# Patient Record
Sex: MALE | Race: WHITE | Employment: FULL TIME | URBAN - METROPOLITAN AREA
[De-identification: names, ages, dates, MRNs, and addresses within clinical notes are randomized per-mention and may not be internally consistent; named-entity substitution may affect disease eponyms.]

---

## 2019-09-05 ENCOUNTER — HOSPITAL ENCOUNTER (OUTPATIENT)
Dept: CT IMAGING | Age: 49
Discharge: HOME | End: 2019-09-05
Payer: COMMERCIAL

## 2019-09-05 DIAGNOSIS — J32.9 CHRONIC SINUSITIS, UNSPECIFIED LOCATION: ICD-10-CM

## 2019-09-05 PROCEDURE — 70486 CT MAXILLOFACIAL W/O DYE: CPT

## 2021-05-12 RX ORDER — METHOCARBAMOL 500 MG/1
500 TABLET, FILM COATED ORAL PRN
COMMUNITY

## 2021-05-12 RX ORDER — FENOFIBRATE 134 MG/1
134 CAPSULE ORAL
COMMUNITY

## 2021-05-12 RX ORDER — DEXTROAMPHETAMINE SACCHARATE, AMPHETAMINE ASPARTATE, DEXTROAMPHETAMINE SULFATE AND AMPHETAMINE SULFATE 7.5; 7.5; 7.5; 7.5 MG/1; MG/1; MG/1; MG/1
30 TABLET ORAL DAILY
COMMUNITY

## 2021-05-12 RX ORDER — FLUTICASONE PROPIONATE 50 MCG
1 SPRAY, SUSPENSION (ML) NASAL DAILY
COMMUNITY

## 2021-05-12 RX ORDER — FEXOFENADINE HCL 180 MG/1
180 TABLET ORAL DAILY
COMMUNITY

## 2021-05-12 NOTE — PROGRESS NOTES
[address] [facility] time___5/8/21 1230_________        Surgery time____1330________    Take the following medications with a sip of water:    Do not eat or drink anything after 12:00 midnight prior to your surgery. This includes water chewing gum, mints and ice chips. You may brush your teeth and gargle the morning of your surgery, but do not swallow the water     Please see your family doctor/pediatrician for a history and physical and/or concerning medications. Bring any test results/reports from your physicians office. If you are under the care of a heart doctor or specialist doctor, please be aware that you may be asked to them for clearance    You may be asked to stop blood thinners such as Coumadin, Plavix, Fragmin, Lovenox, etc., or any anti-inflammatories such as:  Aspirin, Ibuprofen, Advil, Naproxen prior to your surgery. We also ask that you stop any OTC medications such as fish oil, vitamin E, glucosamine, garlic, Multivitamins, COQ 10, etc.    We ask that you do not smoke 24 hours prior to surgery  We ask that you do not  drink any alcoholic beverages 24 hours prior to surgery     You must make arrangements for a responsible adult to take you home after your surgery. For your safety you will not be allowed to leave alone or drive yourself home. Your surgery will be cancelled if you do not have a ride home. Also for your safety, it is strongly suggested that someone stay with you the first 24 hours after your surgery. A parent or legal guardian must accompany a child scheduled for surgery and plan to stay at the hospital until the child is discharged. Please do not bring other children with you. For your comfort, please wear simple loose fitting clothing to the hospital.  Please do not bring valuables.     Do not wear any make-up or nail polish on your fingers or toes      For your safety, please do not wear any jewelry or body piercing's on the day of surgery. All jewelry must be removed. If you have dentures, they will be removed before going to operating room. For your convenience, we will provide you with a container. If you wear contact lenses or glasses, they will be removed, please bring a case for them. If you have a living will and a durable power of  for healthcare, please bring in a copy. As part of our patient safety program to minimize surgical site infections, we ask you to do the following:    · Please notify your surgeon if you develop any illness between         now and the  day of your surgery. · This includes a cough, cold, fever, sore throat, nausea,         or vomiting, and diarrhea, etc.  ·  Please notify your surgeon if you experience dizziness, shortness         of breath or blurred vision between now and the time of your surgery. Do not shave your operative site 96 hours prior to surgery. For face and neck surgery, men may use an electric razor 48 hours   prior to surgery. You may shower the night before surgery or the morning of   your surgery with an antibacterial soap. You will need to bring a photo ID and insurance card    [facility] has an onsite pharmacy, would you like to utilize our pharmacy     If you will be staying overnight and use a C-pap machine, please bring   your C-pap to hospital     Our goal is to provide you with excellent care, therefore, visitors will be limited to two(2) in the room at a time so that we may focus on providing this care for you. Please contact pre-admission testing if you have any further questions. [facility] phone number:  [phone]  Please note these are generalized instructions for all surgical cases, you may be provided with more specific instructions according to your surgery.

## 2021-05-17 ENCOUNTER — ANESTHESIA EVENT (OUTPATIENT)
Dept: ENDOSCOPY | Age: 51
End: 2021-05-17
Payer: COMMERCIAL

## 2021-05-18 ENCOUNTER — ANESTHESIA (OUTPATIENT)
Dept: ENDOSCOPY | Age: 51
End: 2021-05-18
Payer: COMMERCIAL

## 2021-05-18 ENCOUNTER — HOSPITAL ENCOUNTER (OUTPATIENT)
Age: 51
Setting detail: OUTPATIENT SURGERY
Discharge: HOME | End: 2021-05-18
Attending: INTERNAL MEDICINE | Admitting: INTERNAL MEDICINE
Payer: COMMERCIAL

## 2021-05-18 VITALS
OXYGEN SATURATION: 98 % | SYSTOLIC BLOOD PRESSURE: 129 MMHG | DIASTOLIC BLOOD PRESSURE: 82 MMHG | BODY MASS INDEX: 33.62 KG/M2 | HEIGHT: 69 IN | HEART RATE: 63 BPM | WEIGHT: 227 LBS | TEMPERATURE: 98.1 F | RESPIRATION RATE: 16 BRPM

## 2021-05-18 VITALS
DIASTOLIC BLOOD PRESSURE: 78 MMHG | RESPIRATION RATE: 16 BRPM | OXYGEN SATURATION: 99 % | SYSTOLIC BLOOD PRESSURE: 123 MMHG

## 2021-05-18 DIAGNOSIS — Z12.11 SCREEN FOR COLON CANCER: ICD-10-CM

## 2021-05-18 PROCEDURE — 7100000010 HC PHASE II RECOVERY - FIRST 15 MIN: Performed by: INTERNAL MEDICINE

## 2021-05-18 PROCEDURE — 7100000011 HC PHASE II RECOVERY - ADDTL 15 MIN: Performed by: INTERNAL MEDICINE

## 2021-05-18 PROCEDURE — 3609010300 HC COLONOSCOPY W/BIOPSY SINGLE/MULTIPLE: Performed by: INTERNAL MEDICINE

## 2021-05-18 PROCEDURE — 2580000003 HC RX 258: Performed by: ANESTHESIOLOGY

## 2021-05-18 PROCEDURE — 88305 TISSUE EXAM BY PATHOLOGIST: CPT

## 2021-05-18 PROCEDURE — 2709999900 HC NON-CHARGEABLE SUPPLY: Performed by: INTERNAL MEDICINE

## 2021-05-18 PROCEDURE — 3700000000 HC ANESTHESIA ATTENDED CARE: Performed by: INTERNAL MEDICINE

## 2021-05-18 PROCEDURE — 3700000001 HC ADD 15 MINUTES (ANESTHESIA): Performed by: INTERNAL MEDICINE

## 2021-05-18 PROCEDURE — 6360000002 HC RX W HCPCS: Performed by: NURSE ANESTHETIST, CERTIFIED REGISTERED

## 2021-05-18 PROCEDURE — 2580000003 HC RX 258: Performed by: NURSE ANESTHETIST, CERTIFIED REGISTERED

## 2021-05-18 RX ORDER — SODIUM CHLORIDE 9 MG/ML
25 INJECTION, SOLUTION INTRAVENOUS PRN
Status: DISCONTINUED | OUTPATIENT
Start: 2021-05-18 | End: 2021-05-18 | Stop reason: HOSPADM

## 2021-05-18 RX ORDER — SODIUM CHLORIDE 0.9 % (FLUSH) 0.9 %
5-40 SYRINGE (ML) INJECTION EVERY 12 HOURS SCHEDULED
Status: DISCONTINUED | OUTPATIENT
Start: 2021-05-18 | End: 2021-05-18 | Stop reason: HOSPADM

## 2021-05-18 RX ORDER — SODIUM CHLORIDE 0.9 % (FLUSH) 0.9 %
5-40 SYRINGE (ML) INJECTION PRN
Status: DISCONTINUED | OUTPATIENT
Start: 2021-05-18 | End: 2021-05-18 | Stop reason: HOSPADM

## 2021-05-18 RX ORDER — SODIUM CHLORIDE 9 MG/ML
INJECTION, SOLUTION INTRAVENOUS CONTINUOUS
Status: DISCONTINUED | OUTPATIENT
Start: 2021-05-18 | End: 2021-05-18 | Stop reason: HOSPADM

## 2021-05-18 RX ORDER — PROPOFOL 10 MG/ML
INJECTION, EMULSION INTRAVENOUS PRN
Status: DISCONTINUED | OUTPATIENT
Start: 2021-05-18 | End: 2021-05-18 | Stop reason: SDUPTHER

## 2021-05-18 RX ORDER — ONDANSETRON 2 MG/ML
4 INJECTION INTRAMUSCULAR; INTRAVENOUS
Status: DISCONTINUED | OUTPATIENT
Start: 2021-05-18 | End: 2021-05-18 | Stop reason: HOSPADM

## 2021-05-18 RX ORDER — SODIUM CHLORIDE 9 MG/ML
INJECTION, SOLUTION INTRAVENOUS CONTINUOUS PRN
Status: DISCONTINUED | OUTPATIENT
Start: 2021-05-18 | End: 2021-05-18 | Stop reason: SDUPTHER

## 2021-05-18 RX ADMIN — PROPOFOL 150 MG: 10 INJECTION, EMULSION INTRAVENOUS at 13:48

## 2021-05-18 RX ADMIN — PROPOFOL 150 MG: 10 INJECTION, EMULSION INTRAVENOUS at 13:42

## 2021-05-18 RX ADMIN — SODIUM CHLORIDE: 9 INJECTION, SOLUTION INTRAVENOUS at 13:18

## 2021-05-18 RX ADMIN — SODIUM CHLORIDE: 9 INJECTION, SOLUTION INTRAVENOUS at 13:37

## 2021-05-18 ASSESSMENT — LIFESTYLE VARIABLES: SMOKING_STATUS: 0

## 2021-05-18 ASSESSMENT — PAIN SCALES - GENERAL
PAINLEVEL_OUTOF10: 0
PAINLEVEL_OUTOF10: 0

## 2021-05-18 ASSESSMENT — PAIN - FUNCTIONAL ASSESSMENT: PAIN_FUNCTIONAL_ASSESSMENT: 0-10

## 2021-05-18 ASSESSMENT — ENCOUNTER SYMPTOMS: SHORTNESS OF BREATH: 0

## 2021-05-18 NOTE — ANESTHESIA PRE PROCEDURE
Department of Anesthesiology  Preprocedure Note       Name:  Roya Aggarwal   Age:  45 y.o.  :  1970                                          MRN:  [id number]         Date:  2021      Surgeon: Ovidio Lundberg):  Rebecca Vargas MD    Procedure: Procedure(s):  COLORECTAL CANCER SCREENING, NOT HIGH RISK    Medications prior to admission:   Prior to Admission medications    Medication Sig Start Date End Date Taking? Authorizing Provider   fexofenadine (ALLEGRA ALLERGY) 180 MG tablet Take 180 mg by mouth daily   Yes Historical Provider, MD   fenofibrate micronized (LOFIBRA) 134 MG capsule Take 134 mg by mouth every morning (before breakfast)   Yes Historical Provider, MD   fluticasone (FLONASE) 50 MCG/ACT nasal spray 1 spray by Each Nostril route daily   Yes Historical Provider, MD   methocarbamol (ROBAXIN) 500 MG tablet Take 500 mg by mouth as needed   Yes Historical Provider, MD   amphetamine-dextroamphetamine (ADDERALL) 30 MG tablet Take 30 mg by mouth daily. Yes Historical Provider, MD   fish oil-omega-3 fatty acids 1000 MG capsule Take 2 g by mouth daily. Yes Historical Provider, MD   celecoxib (CELEBREX) 200 MG capsule Take 1 capsule by mouth daily. Patient taking differently: Take 200 mg by mouth as needed  6/3/11 5/12/21 Yes Shayy Lowry DO   Hydrocodone-Acetaminophen (VICODIN PO) Take 10 mg by mouth every 4 hours as needed. Yes Historical Provider, MD   Loratadine-Pseudoephedrine (CLARITIN-D 24 HOUR PO) Take 1 tablet by mouth daily. 8/13/10  Yes Historical Provider, MD   omeprazole (PRILOSEC) 20 MG capsule Take 20 mg by mouth daily.    Yes Historical Provider, MD       Current medications:    Current Facility-Administered Medications   Medication Dose Route Frequency Provider Last Rate Last Admin    0.9 % sodium chloride infusion   Intravenous Continuous Brianne Michael MD        sodium chloride flush 0.9 % injection 5-40 mL  5-40 mL Intravenous 2 times per day Brianne Michael MD       [facility] SpO2:  97%   Weight: 230 lb (104.3 kg) 227 lb (103 kg)   Height: 5' 9.5\" (1.765 m) 5' 9\" (1.753 m)                                              BP Readings from Last 3 Encounters:   05/18/21 137/78   03/01/13 120/70   11/06/12 (!) 138/93       NPO Status: Time of last liquid consumption: 0800                        Time of last solid consumption: 0730                        Date of last liquid consumption: 05/18/21                        Date of last solid food consumption: 05/17/21    BMI:   Wt Readings from Last 3 Encounters:   05/18/21 227 lb (103 kg)   03/01/13 207 lb 8 oz (94.1 kg)   02/27/13 227 lb 1.2 oz (103 kg)     Body mass index is 33.52 kg/m². CBC:   Lab Results   Component Value Date    WBC 10.6 12/07/2013    RBC 5.65 12/07/2013    HGB 17.5 12/07/2013    HCT 52.1 12/07/2013    MCV 92.2 12/07/2013    RDW 11.8 12/07/2013     12/07/2013       CMP:   Lab Results   Component Value Date     11/06/2012    K 4.7 11/06/2012     11/06/2012    CO2 31 11/06/2012    BUN 21 11/06/2012    CREATININE 1.2 11/06/2012    GFRAA >60 11/06/2012    GLUCOSE 99 11/06/2012    CALCIUM 9.4 11/06/2012       POC Tests: No results for input(s): POCGLU, POCNA, POCK, POCCL, POCBUN, POCHEMO, POCHCT in the last 72 hours.     Coags:   Lab Results   Component Value Date    PROTIME 10.5 12/07/2013    INR 0.94 12/07/2013       HCG (If Applicable): No results found for: PREGTESTUR, PREGSERUM, HCG, HCGQUANT     ABGs: No results found for: PHART, PO2ART, SGO1CLL, LDG1STA, BEART, [id number]     Type & Screen (If Applicable):  No results found for: LABABO, LABRH    Drug/Infectious Status (If Applicable):  No results found for: HIV, HEPCAB    COVID-19 Screening (If Applicable): No results found for: COVID19        Anesthesia Evaluation  Patient summary reviewed no history of anesthetic complications:   Airway: Mallampati: II  TM distance: >3 FB   Neck ROM: full  Mouth opening: > = 3 FB Dental:      Comment: No loose teeth Pulmonary: breath sounds clear to auscultation  (+) sleep apnea: on CPAP,      (-) COPD, asthma, shortness of breath, recent URI and not a current smoker                           Cardiovascular:    (+) hyperlipidemia    (-) hypertension, valvular problems/murmurs, past MI, CAD, CABG/stent, dysrhythmias,  angina and  CHF      Rhythm: regular  Rate: normal                    Neuro/Psych:   (+) headaches:,    (-) seizures, neuromuscular disease, TIA and CVA           GI/Hepatic/Renal:   (+) GERD: well controlled, bowel prep,      (-) PUD, hepatitis and liver disease       Endo/Other:    (+) : arthritis:., .    (-) diabetes mellitus, hypothyroidism, hyperthyroidism               Abdominal:           Vascular:                                      Anesthesia Plan      MAC     ASA 2       Induction: intravenous. Anesthetic plan and risks discussed with patient. Plan discussed with CRNA. This pre-anesthesia assessment may be used as a history and physical.    DOS STAFF ADDENDUM:    Pt seen and examined, chart reviewed (including anesthesia, drug and allergy history). No interval changes to history and physical examination. Anesthetic plan, risks, benefits, alternatives, and personnel involved discussed with patient. Patient verbalized an understanding and agrees to proceed.       Earline García MD  May 18, 2021  1:13 PM      Earline García MD   5/18/2021

## 2021-05-18 NOTE — PROCEDURES
[facility]  Endoscopy Note    Patient: Wayne White  : 1970  Acct#: [de-identified]    Procedure: Colonoscopy with biopsy    Date:  2021    Surgeon:  Edith Ma MD, MD    Referring Physician:  Andrew Fermin    Previous Colonoscopy: No  Date: na  Greater than 3 years? na    Preoperative Diagnosis:  screening    Postoperative Diagnosis:  Colon polyps    Anesthesia:  See anesthesia note    Indications: This is a 45y.o. year old male who presents today with screening for colon cancer. Procedure: An informed consent was obtained from the patient after explanation of indications, benefits, possible risks and complications of the procedure. The patient was then taken to the endoscopy suite, placed in the left lateral decubitus position, and the above IV anesthesia was administered. A digital rectal examination was performed and revealed negative without mass, lesions or tenderness. The Olympus CFQ-180-AL video colonoscope was placed in the patient's rectum under digital direction and advanced to the cecum. The cecum was identified by characteristic anatomy and ballottment. The ileocecal valve was identified. The preparation was excellent. The scope was then withdrawn back through the cecum, ascending, transverse, descending and sigmoid colons. Carefull circumferential examination of the mucosa in these areas demonstrated two 2 mm polyps in the ascending colon that were biopsied and removed. There is a 6 mm lipoma in the sigmoid colon. The scope was then withdrawn into the rectum and retroflexed. The retroflexed view of the anal verge and rectum demonstrates no abnormalities. The scope was straightened, the colon was decompressed and the scope was withdrawn from the patient. The patient tolerated the procedure well and was taken to the PACU in good condition. Estimated Blood Loss:  none    Impression:  Colon polyps    Recommendations:  Await pathology.   Repeat colonoscopy in 5 years.     Rene Kingsley MD, MD   [facility]  5/18/2021

## 2021-05-18 NOTE — H&P
[facility]   Pre-operative History and Physical    Patient: Amy Fine IV  : 1970  Acct#: [de-identified]    History Obtained From: electronic medical record    HISTORY OF PRESENT ILLNESS  Procedure:Colonoscopy  Indications:screening  Past Medical History:        Diagnosis Date    Arthritis     MILD    Back pain     OCC SPASMS- TAKES ZOLOFT    Back pain     GERD (gastroesophageal reflux disease)     Hyperlipidemia     Migraine     Sleep apnea      C-PAP     Past Surgical History:        Procedure Laterality Date    CARPAL TUNNEL RELEASE      RT    FINGER TRIGGER RELEASE Left 3-1-2013    left thumb a1 pulley trigger release    HEMORRHOID SURGERY      HERNIA REPAIR      INGUINAL HERNIA REPAIR      left    KNEE ARTHROSCOPY  R knee x 4    NASAL SEPTUM SURGERY      repair deviated septum    SHOULDER ARTHROSCOPY  2012    LEFT SHOULDER ARTHROSCOPE, LONG HEAD OF THE BICEP TENDONESIS  DEBRIDEMENT, FOREIGN BODY REMOVAL, SYNOVECTOMY               SHOULDER ARTHROSCOPY  6/3/11    subacromial decompression, synovectomy and removal of loose body    SHOULDER ARTHROSCOPY  2012    left shoulder arthroscopy with loose body removal, type IV superior labram anterior posterior repair, carpal tunnel release    SHOULDER ARTHROSCOPY  2012    LEFT SHOULDER ARTHROSCOPE, LONG HEAD OF THE BICEP TENDONESIS    WRIST SURGERY      LT AND RT     Medications prior to admission:   Prior to Admission medications    Medication Sig Start Date End Date Taking?  Authorizing Provider   fexofenadine (ALLEGRA ALLERGY) 180 MG tablet Take 180 mg by mouth daily   Yes Historical Provider, MD   fenofibrate micronized (LOFIBRA) 134 MG capsule Take 134 mg by mouth every morning (before breakfast)   Yes Historical Provider, MD   fluticasone (FLONASE) 50 MCG/ACT nasal spray 1 spray by Each Nostril route daily   Yes Historical Provider, MD   methocarbamol (ROBAXIN) 500 MG tablet Take 500 mg by mouth as needed and Family:     Attends [employer] Services:     Active Member of Clubs or Organizations:     Attends Club or Organization Meetings:     Marital Status:    Intimate Partner Violence:     Fear of Current or Ex-Partner:     Emotionally Abused:     Physically Abused:     Sexually Abused:      Family History   Problem Relation Age of Onset    High Cholesterol Mother     High Cholesterol Father     High Cholesterol Sister     High Cholesterol Brother     High Cholesterol Maternal Uncle     High Cholesterol Maternal Grandmother     High Cholesterol Maternal Grandfather          PHYSICAL EXAM:      /78   Pulse 70   Temp 97.6 °F (36.4 °C) (Temporal)   Resp 16   Ht 5' 9\" (1.753 m)   Wt 227 lb (103 kg)   SpO2 97%   BMI 33.52 kg/m²  I        Heart:normal    Lungs: normal    Abdomen: normal      ASA Grade:  See anesthesia note      ASSESSMENT AND PLAN:    1. Procedure options, risks and benefits reviewed with patient and expresses understanding.

## 2021-06-29 ENCOUNTER — ANESTHESIA EVENT (OUTPATIENT)
Dept: ENDOSCOPY | Age: 51
End: 2021-06-29
Payer: COMMERCIAL

## 2021-06-29 NOTE — PROGRESS NOTES
[address] [facility] time____6/30/21 0800________        Surgery time__1000__________    Take the following medications with a sip of water:    Do not eat or drink anything after 12:00 midnight prior to your surgery. This includes water chewing gum, mints and ice chips. You may brush your teeth and gargle the morning of your surgery, but do not swallow the water     Please see your family doctor/pediatrician for a history and physical and/or concerning medications. Bring any test results/reports from your physicians office. If you are under the care of a heart doctor or specialist doctor, please be aware that you may be asked to them for clearance    You may be asked to stop blood thinners such as Coumadin, Plavix, Fragmin, Lovenox, etc., or any anti-inflammatories such as:  Aspirin, Ibuprofen, Advil, Naproxen prior to your surgery. We also ask that you stop any OTC medications such as fish oil, vitamin E, glucosamine, garlic, Multivitamins, COQ 10, etc.    We ask that you do not smoke 24 hours prior to surgery  We ask that you do not  drink any alcoholic beverages 24 hours prior to surgery     You must make arrangements for a responsible adult to take you home after your surgery. For your safety you will not be allowed to leave alone or drive yourself home. Your surgery will be cancelled if you do not have a ride home. Also for your safety, it is strongly suggested that someone stay with you the first 24 hours after your surgery. A parent or legal guardian must accompany a child scheduled for surgery and plan to stay at the hospital until the child is discharged. Please do not bring other children with you. For your comfort, please wear simple loose fitting clothing to the hospital.  Please do not bring valuables.     Do not wear any make-up or nail polish on your fingers or toes      For your safety, please do not wear any jewelry or body piercing's on the day of surgery. All jewelry must be removed. If you have dentures, they will be removed before going to operating room. For your convenience, we will provide you with a container. If you wear contact lenses or glasses, they will be removed, please bring a case for them. If you have a living will and a durable power of  for healthcare, please bring in a copy. As part of our patient safety program to minimize surgical site infections, we ask you to do the following:    · Please notify your surgeon if you develop any illness between         now and the  day of your surgery. · This includes a cough, cold, fever, sore throat, nausea,         or vomiting, and diarrhea, etc.  ·  Please notify your surgeon if you experience dizziness, shortness         of breath or blurred vision between now and the time of your surgery. Do not shave your operative site 96 hours prior to surgery. For face and neck surgery, men may use an electric razor 48 hours   prior to surgery. You may shower the night before surgery or the morning of   your surgery with an antibacterial soap. You will need to bring a photo ID and insurance card    [facility] has an onsite pharmacy, would you like to utilize our pharmacy     If you will be staying overnight and use a C-pap machine, please bring   your C-pap to hospital     Our goal is to provide you with excellent care, therefore, visitors will be limited to two(2) in the room at a time so that we may focus on providing this care for you. Please contact pre-admission testing if you have any further questions. [facility] phone number:  [phone]  Please note these are generalized instructions for all surgical cases, you may be provided with more specific instructions according to your surgery.

## 2021-06-29 NOTE — FLOWSHEET NOTE
Preoperative Screening for Elective Surgery/Invasive Procedures While COVID-19 present in the community     Have you tested positive or have been told to self-isolate for COVID-19 like symptoms within the past 28 days?  Do you currently have any of the following symptoms? o Fever >100.0 F or 99.9 F in immunocompromised patients? o New onset cough, shortness of breath or difficulty breathing?  o New onset sore throat, myalgia (muscle aches and pains), headache, loss of taste/smell or diarrhea?  Have you had a potential exposure to COVID-19 within the past 14 days by:  o Close contact with a confirmed case? o Close contact with a healthcare worker,  or essential infrastructure worker (grocery store, [employer], gas station, public utilities or transportation)? o Do you reside in a congregate setting such as; skilled nursing facility, adult home, correctional facility, homeless shelter or other institutional setting?  o Have you had recent travel to a known COVID-19 hotspot? Indicate if the patient has a positive screen by answering yes to one or more of the above questions. Patients who test positive or screen positive prior to surgery or on the day of surgery should be evaluated in conjunction with the surgeon/proceduralist/anesthesiologist to determine the urgency of the procedure. no to all above. Will need RAPID test the day of procedure.

## 2021-06-30 ENCOUNTER — ANESTHESIA (OUTPATIENT)
Dept: ENDOSCOPY | Age: 51
End: 2021-06-30
Payer: COMMERCIAL

## 2021-06-30 ENCOUNTER — HOSPITAL ENCOUNTER (OUTPATIENT)
Age: 51
Setting detail: OUTPATIENT SURGERY
Discharge: HOME | End: 2021-06-30
Attending: INTERNAL MEDICINE | Admitting: INTERNAL MEDICINE
Payer: COMMERCIAL

## 2021-06-30 VITALS
SYSTOLIC BLOOD PRESSURE: 126 MMHG | WEIGHT: 231.38 LBS | BODY MASS INDEX: 33.12 KG/M2 | DIASTOLIC BLOOD PRESSURE: 81 MMHG | TEMPERATURE: 97.1 F | HEIGHT: 70 IN | RESPIRATION RATE: 16 BRPM | OXYGEN SATURATION: 98 % | HEART RATE: 62 BPM

## 2021-06-30 VITALS — SYSTOLIC BLOOD PRESSURE: 122 MMHG | DIASTOLIC BLOOD PRESSURE: 91 MMHG | OXYGEN SATURATION: 94 %

## 2021-06-30 LAB — SARS-COV-2, NAAT: NOT DETECTED

## 2021-06-30 PROCEDURE — 87635 SARS-COV-2 COVID-19 AMP PRB: CPT

## 2021-06-30 PROCEDURE — 3700000000 HC ANESTHESIA ATTENDED CARE: Performed by: INTERNAL MEDICINE

## 2021-06-30 PROCEDURE — 2500000003 HC RX 250 WO HCPCS: Performed by: NURSE ANESTHETIST, CERTIFIED REGISTERED

## 2021-06-30 PROCEDURE — 6360000002 HC RX W HCPCS: Performed by: NURSE ANESTHETIST, CERTIFIED REGISTERED

## 2021-06-30 PROCEDURE — 2580000003 HC RX 258: Performed by: ANESTHESIOLOGY

## 2021-06-30 PROCEDURE — 3609017100 HC EGD: Performed by: INTERNAL MEDICINE

## 2021-06-30 PROCEDURE — 7100000011 HC PHASE II RECOVERY - ADDTL 15 MIN: Performed by: INTERNAL MEDICINE

## 2021-06-30 PROCEDURE — 7100000010 HC PHASE II RECOVERY - FIRST 15 MIN: Performed by: INTERNAL MEDICINE

## 2021-06-30 RX ORDER — LIDOCAINE HYDROCHLORIDE 20 MG/ML
INJECTION, SOLUTION EPIDURAL; INFILTRATION; INTRACAUDAL; PERINEURAL PRN
Status: DISCONTINUED | OUTPATIENT
Start: 2021-06-30 | End: 2021-06-30 | Stop reason: SDUPTHER

## 2021-06-30 RX ORDER — SODIUM CHLORIDE 9 MG/ML
INJECTION, SOLUTION INTRAVENOUS CONTINUOUS
Status: DISCONTINUED | OUTPATIENT
Start: 2021-06-30 | End: 2021-06-30 | Stop reason: HOSPADM

## 2021-06-30 RX ORDER — PROPOFOL 10 MG/ML
INJECTION, EMULSION INTRAVENOUS PRN
Status: DISCONTINUED | OUTPATIENT
Start: 2021-06-30 | End: 2021-06-30 | Stop reason: SDUPTHER

## 2021-06-30 RX ORDER — SODIUM CHLORIDE 9 MG/ML
25 INJECTION, SOLUTION INTRAVENOUS PRN
Status: DISCONTINUED | OUTPATIENT
Start: 2021-06-30 | End: 2021-06-30 | Stop reason: HOSPADM

## 2021-06-30 RX ORDER — SODIUM CHLORIDE 0.9 % (FLUSH) 0.9 %
5-40 SYRINGE (ML) INJECTION EVERY 12 HOURS SCHEDULED
Status: DISCONTINUED | OUTPATIENT
Start: 2021-06-30 | End: 2021-06-30 | Stop reason: HOSPADM

## 2021-06-30 RX ORDER — SODIUM CHLORIDE 0.9 % (FLUSH) 0.9 %
5-40 SYRINGE (ML) INJECTION PRN
Status: DISCONTINUED | OUTPATIENT
Start: 2021-06-30 | End: 2021-06-30 | Stop reason: HOSPADM

## 2021-06-30 RX ADMIN — LIDOCAINE HYDROCHLORIDE 50 MG: 20 INJECTION, SOLUTION EPIDURAL; INFILTRATION; INTRACAUDAL; PERINEURAL at 09:56

## 2021-06-30 RX ADMIN — PROPOFOL 200 MG: 10 INJECTION, EMULSION INTRAVENOUS at 09:56

## 2021-06-30 RX ADMIN — SODIUM CHLORIDE: 9 INJECTION, SOLUTION INTRAVENOUS at 09:12

## 2021-06-30 RX ADMIN — PROPOFOL 50 MG: 10 INJECTION, EMULSION INTRAVENOUS at 10:03

## 2021-06-30 RX ADMIN — PROPOFOL 50 MG: 10 INJECTION, EMULSION INTRAVENOUS at 10:00

## 2021-06-30 ASSESSMENT — PAIN SCALES - GENERAL
PAINLEVEL_OUTOF10: 0

## 2021-06-30 ASSESSMENT — ENCOUNTER SYMPTOMS: SHORTNESS OF BREATH: 0

## 2021-06-30 ASSESSMENT — PAIN - FUNCTIONAL ASSESSMENT: PAIN_FUNCTIONAL_ASSESSMENT: 0-10

## 2021-06-30 NOTE — ANESTHESIA PRE PROCEDURE
[facility] Department of Anesthesiology  Pre-Anesthesia Evaluation/Consultation       Name:  Don Knox  : 1970  Age:  45 y.o.                                            MRN:  [id number]  Date: 2021           Surgeon: Surgeon(s):  Sara Kelly MD    Procedure: Procedure(s):  EGD ESOPHAGOGASTRODUODENOSCOPY     Allergies   Allergen Reactions    Morphine      MADE EMOTIONAL     Patient Active Problem List   Diagnosis    Chest pain    Hyperlipidemia    Sleep apnea     Past Medical History:   Diagnosis Date    Arthritis     MILD    Back pain     OCC SPASMS- TAKES ZOLOFT    Back pain     GERD (gastroesophageal reflux disease)     Hyperlipidemia     Migraine     Sleep apnea      C-PAP     Past Surgical History:   Procedure Laterality Date    CARPAL TUNNEL RELEASE      RT    COLONOSCOPY N/A 2021    COLONOSCOPY WITH BIOPSY performed by Sara Kelly MD at [address] Left 3-1-2013    left thumb a1 pulley trigger release    HEMORRHOID SURGERY      HERNIA REPAIR      INGUINAL HERNIA REPAIR      left    KNEE ARTHROSCOPY  R knee x 4    NASAL SEPTUM SURGERY      repair deviated septum    SHOULDER ARTHROSCOPY  2012    LEFT SHOULDER ARTHROSCOPE, LONG HEAD OF THE BICEP TENDONESIS  DEBRIDEMENT, FOREIGN BODY REMOVAL, SYNOVECTOMY               SHOULDER ARTHROSCOPY  6/3/11    subacromial decompression, synovectomy and removal of loose body    SHOULDER ARTHROSCOPY  2012    left shoulder arthroscopy with loose body removal, type IV superior labram anterior posterior repair, carpal tunnel release    SHOULDER ARTHROSCOPY  2012    LEFT SHOULDER ARTHROSCOPE, LONG HEAD OF THE BICEP TENDONESIS    WRIST SURGERY      LT AND RT     Social History     Tobacco Use    Smoking status: Never Smoker    Smokeless tobacco: Never Used   Vaping Use    Vaping Use: Never used   Substance Use Topics    Alcohol use: Yes     Comment: RARE    Drug use: No     Medications  No current facility-administered medications on file prior to encounter. Current Outpatient Medications on File Prior to Encounter   Medication Sig Dispense Refill    fexofenadine (ALLEGRA ALLERGY) 180 MG tablet Take 180 mg by mouth daily      fenofibrate micronized (LOFIBRA) 134 MG capsule Take 134 mg by mouth every morning (before breakfast)      fluticasone (FLONASE) 50 MCG/ACT nasal spray 1 spray by Each Nostril route daily      methocarbamol (ROBAXIN) 500 MG tablet Take 500 mg by mouth as needed      amphetamine-dextroamphetamine (ADDERALL) 30 MG tablet Take 30 mg by mouth daily.  fish oil-omega-3 fatty acids 1000 MG capsule Take 2 g by mouth daily.  celecoxib (CELEBREX) 200 MG capsule Take 1 capsule by mouth daily. (Patient taking differently: Take 200 mg by mouth as needed ) 30 capsule 2    Hydrocodone-Acetaminophen (VICODIN PO) Take 10 mg by mouth every 4 hours as needed.  Loratadine-Pseudoephedrine (CLARITIN-D 24 HOUR PO) Take 1 tablet by mouth daily.  omeprazole (PRILOSEC) 20 MG capsule Take 20 mg by mouth daily.        Current Facility-Administered Medications   Medication Dose Route Frequency Provider Last Rate Last Admin    0.9 % sodium chloride infusion   Intravenous Continuous Joni Figueroa MD 75 mL/hr at 06/30/21 0912 New Bag at 06/30/21 0912    sodium chloride flush 0.9 % injection 5-40 mL  5-40 mL Intravenous 2 times per day Joni Figueroa MD        sodium chloride flush 0.9 % injection 5-40 mL  5-40 mL Intravenous PRN Joni Figueroa MD        0.9 % sodium chloride infusion  25 mL Intravenous PRN Joni Figueroa MD         Vital Signs (Current)   Vitals:    06/29/21 1253 06/30/21 0908   BP:  (!) 142/98   Pulse:  63   Resp:  16   Temp:  97 °F (36.1 °C)   TempSrc:  Temporal   SpO2:  96%   Weight: 227 lb (103 kg) 231 lb 6 oz (105 kg)   Height: 5' 9\" (1.753 m) 5' 10\" (1.778 m) Vital Signs Statistics (for past 48 hrs)     Temp  Av °F (36.1 °C)  Min: 97 °F (36.1 °C)   Min taken time: 21  Max: 97 °F (36.1 °C)   Max taken time: 21  Pulse  Av  Min: 61   Min taken time: 21  Max: 61   Max taken time: 21  Resp  Av  Min: 12   Min taken time: 21  Max: 12   Max taken time: 21  BP  Min: 142/98   Min taken time: 21  Max: 142/98   Max taken time: 21  SpO2  Av %  Min: 96 %   Min taken time: 21  Max: 96 %   Max taken time: 21  BP Readings from Last 3 Encounters:   21 (!) 142/98   21 123/78   21 129/82       BMI  Body mass index is 33.2 kg/m². Estimated body mass index is 33.2 kg/m² as calculated from the following:    Height as of this encounter: 5' 10\" (1.778 m). Weight as of this encounter: 231 lb 6 oz (105 kg). CBC   Lab Results   Component Value Date    WBC 10.6 2013    RBC 5.65 2013    HGB 17.5 2013    HCT 52.1 2013    MCV 92.2 2013    RDW 11.8 2013     2013     CMP    Lab Results   Component Value Date     2012    K 4.7 2012     2012    CO2 31 2012    BUN 21 2012    CREATININE 1.2 2012    GFRAA >60 2012    GLUCOSE 99 2012    CALCIUM 9.4 2012     BMP    Lab Results   Component Value Date     2012    K 4.7 2012     2012    CO2 31 2012    BUN 21 2012    CREATININE 1.2 2012    CALCIUM 9.4 2012    GFRAA >60 2012    GLUCOSE 99 2012     POCGlucose  No results for input(s): GLUCOSE in the last 72 hours.    [facility]    Lab Results   Component Value Date    PROTIME 10.5 2013    INR 0.94      HCG (If Applicable) No results found for: PREGTESTUR, PREGSERUM, HCG, HCGQUANT   ABGs No results found for: PHART, PO2ART, WUY9JYU, YLW1UGR, BEART, A8NYZWVH   Type & Screen (If Applicable)  No results found for: Kelly Zhao                         BMI: Wt Readings from Last 3 Encounters:       NPO Status:   Date of last liquid consumption: 06/29/21   Time of last liquid consumption: 2000   Date of last solid food consumption: 06/29/21      Time of last solid consumption: 2000       Anesthesia Evaluation  Patient summary reviewed  Airway: Mallampati: III  TM distance: >3 FB   Neck ROM: full  Mouth opening: > = 3 FB Dental: normal exam         Pulmonary:   (+) sleep apnea: on CPAP,      (-) COPD, asthma and shortness of breath                           Cardiovascular:    (+) hyperlipidemia    (-) hypertension, valvular problems/murmurs, past MI, CAD, CABG/stent, dysrhythmias and  angina                Neuro/Psych:   (+) headaches:,    (-) seizures, TIA and CVA           GI/Hepatic/Renal:   (+) GERD:,      (-) PUD, liver disease and no renal disease       Endo/Other:    (+) : arthritis:., .    (-) diabetes mellitus               Abdominal:             Vascular: negative vascular ROS. Other Findings:           Anesthesia Plan      MAC     ASA 2     (I discussed intravenous sedation to the patient's satisfaction including risks and alternatives. The patient agreed with the plan and has no further questions. Mirian Strong MD )  Induction: intravenous. Anesthetic plan and risks discussed with patient. Plan discussed with CRNA. This pre-anesthesia assessment may be used as a history and physical.    DOS STAFF ADDENDUM:    Pt seen and examined, chart reviewed (including anesthesia, drug and allergy history). No interval changes to history and physical examination. Anesthetic plan, risks, benefits, alternatives, and personnel involved discussed with patient. Patient verbalized an understanding and agrees to proceed.       Mirian Strong MD  June 30, 2021  9:25 AM

## 2021-06-30 NOTE — ADDENDUM NOTE
Addendum  created 06/30/21 1147 by Marquis Mcburney, APRN - CRNA    Flowsheet accepted, Intraprocedure Flowsheets edited

## 2021-06-30 NOTE — ANESTHESIA POSTPROCEDURE EVALUATION
Department of Anesthesiology  Postprocedure Note    Patient: Hoda Bueno  MRN: [id number]  YOB: 1970  Date of evaluation: 6/30/2021  Time:  10:31 AM     Procedure Summary     Date: 06/30/21 Room / Location: [address] [place] [address]    Anesthesia Start: 4000 Anesthesia Stop: 1004    Procedure: EGD ESOPHAGOGASTRODUODENOSCOPY (N/A ) Diagnosis:       Gastroesophageal reflux disease, unspecified whether esophagitis present      Bloating      (Gastroesophageal reflux disease, Bloating)    Surgeons: Kody Vela MD Responsible Provider: Bonnie Coreas MD    Anesthesia Type: MAC ASA Status: 2          Anesthesia Type: MAC    Marzena Phase I: Marzena Score: 10    Marzena Phase II: Marzena Score: 10    Last vitals: Reviewed and per EMR flowsheets.        Anesthesia Post Evaluation    Patient location during evaluation: PACU  Level of consciousness: awake and alert  Airway patency: patent  Nausea & Vomiting: no nausea and no vomiting  Complications: no  Cardiovascular status: blood pressure returned to baseline  Respiratory status: acceptable  Hydration status: euvolemic  Comments: Postoperative Anesthesia Note    Name:    Hoda Bueno  MRN:      [id number]    Patient Vitals in the past 12 hrs:  06/30/21 1024, BP:126/81, Pulse:62, Resp:16, SpO2:98 %  06/30/21 1019, BP:120/80, Pulse:60, Resp:16, SpO2:96 %  06/30/21 1012, BP:126/81, Temp:97.1 °F (36.2 °C), Temp src:Temporal, Pulse:65, Resp:16, SpO2:96 %  06/30/21 0908, BP:(!) 142/98, Temp:97 °F (36.1 °C), Temp src:Temporal, Pulse:63, Resp:16, SpO2:96 %, Height:5' 10\" (1.778 m), Weight:231 lb 6 oz (105 kg)     LABS:    CBC  Lab Results       Component                Value               Date/Time                  WBC                      10.6                12/07/2013 05:38 PM        HGB                      17.5                12/07/2013 05:38 PM        HCT                      52.1                12/07/2013 05:38 PM PLT                      242                 12/07/2013 05:38 PM   RENAL  Lab Results       Component                Value               Date/Time                  NA                       141                 11/06/2012 11:31 AM        K                        4.7                 11/06/2012 11:31 AM        CL                       110                 11/06/2012 11:31 AM        CO2                      31                  11/06/2012 11:31 AM        BUN                      21 (H)              11/06/2012 11:31 AM        CREATININE               1.2                 11/06/2012 11:31 AM        GLUCOSE                  99                  11/06/2012 11:31 AM   COAGS  Lab Results       Component                Value               Date/Time                  PROTIME                  10.5                12/07/2013 05:38 PM        INR                      0.94                12/07/2013 05:38 PM     Intake & Output:  @63PBAO@    Nausea & Vomiting:  No    Level of Consciousness:  Awake    Pain Assessment:  Adequate analgesia    Anesthesia Complications:  No apparent anesthetic complications    SUMMARY      Vital signs stable  OK to discharge from Stage I post anesthesia care.   Care transferred from Anesthesiology department on discharge from perioperative area

## 2021-06-30 NOTE — H&P
Historical Provider, MD   methocarbamol (ROBAXIN) 500 MG tablet Take 500 mg by mouth as needed   Yes Historical Provider, MD   amphetamine-dextroamphetamine (ADDERALL) 30 MG tablet Take 30 mg by mouth daily. Yes Historical Provider, MD   fish oil-omega-3 fatty acids 1000 MG capsule Take 2 g by mouth daily. Yes Historical Provider, MD   celecoxib (CELEBREX) 200 MG capsule Take 1 capsule by mouth daily. Patient taking differently: Take 200 mg by mouth as needed  6/3/11 6/29/21 Yes Brendon Motta,    Hydrocodone-Acetaminophen (VICODIN PO) Take 10 mg by mouth every 4 hours as needed. Yes Historical Provider, MD   Loratadine-Pseudoephedrine (CLARITIN-D 24 HOUR PO) Take 1 tablet by mouth daily. 8/13/10  Yes Historical Provider, MD   omeprazole (PRILOSEC) 20 MG capsule Take 20 mg by mouth daily. Yes Historical Provider, MD     Allergies:   Morphine    Social History     Socioeconomic History    Marital status:      Spouse name: Not on file    Number of children: Not on file    Years of education: Not on file    Highest education level: Not on file   Occupational History    Not on file   Tobacco Use    Smoking status: Never Smoker    Smokeless tobacco: Never Used   Vaping Use    Vaping Use: Never used   Substance and Sexual Activity    Alcohol use: Yes     Comment: RARE    Drug use: No    Sexual activity: Yes     Partners: Female   Other Topics Concern    Not on file   Social History Narrative    Not on file     Social Determinants of Health     Financial Resource Strain:     Difficulty of Paying Living Expenses:    Food Insecurity:     Worried About Running Out of Food in the Last Year:     [address] in the Last Year:    Transportation Needs:     Lack of Transportation (Medical):      Lack of Transportation (Non-Medical):    Physical Activity:     Days of Exercise per Week:     Minutes of Exercise per Session:    Stress:     Feeling of Stress :    Social Connections:     Frequency of Communication with Friends and Family:     Frequency of Social Gatherings with Friends and Family:     Attends [employer] Services:     Active Member of Clubs or Organizations:     Attends Club or Organization Meetings:     Marital Status:    Intimate Partner Violence:     Fear of Current or Ex-Partner:     Emotionally Abused:     Physically Abused:     Sexually Abused:      Family History   Problem Relation Age of Onset    High Cholesterol Mother     High Cholesterol Father     High Cholesterol Sister     High Cholesterol Brother     High Cholesterol Maternal Uncle     High Cholesterol Maternal Grandmother     High Cholesterol Maternal Grandfather          PHYSICAL EXAM:      BP (!) 142/98   Pulse 63   Temp 97 °F (36.1 °C) (Temporal)   Resp 16   Ht 5' 10\" (1.778 m)   Wt 231 lb 6 oz (105 kg)   SpO2 96%   BMI 33.20 kg/m²  I        Heart:normal    Lungs: normal    Abdomen: normal      ASA Grade:  See anesthesia note      ASSESSMENT AND PLAN:    1. Procedure options, risks and benefits reviewed with patient and expresses understanding.

## 2021-06-30 NOTE — PROCEDURES
[facility]  Endoscopy Note    Patient: Rivera Soares  : 1970  Acct#: [de-identified]    Procedure: Esophagogastroduodenoscopy     Date:  2021     Surgeon:  Sharlotte Apley, MD, MD    Referring Physician:  Teo Graham    Preoperative Diagnosis:  GERD    Postoperative Diagnosis:  Normal EGD    Anesthesia: see anesthesia note. Indications: This is a 45y.o. year old male who presents today with chronic GERD. Description of Procedure:  Informed consent was obtained from the patient after explanation of indications, benefits and possible risks and complications of the procedure. The patient was then taken to the endoscopy suite, placed in the left lateral decubitus position and the above IV sedation was administrered. The Olympus videoendoscope was placed in the patient's mouth and under direct visualization passed into the esophagus. Visualization of the esophagus demonstrated normal..     The scope was then advanced into the stomach. Visualization of the gastric body and antrum demonstrated normal..  A retroflexed exam of the gastric cardia and fundus demonstrated normal..  The pylorus was patent and the scope was advanced into the duodenum. Visualization of the duodenal bulb demonstrated normal..  The second portion of the duodenum demonstrated normal..    The scope was then withdrawn back into the stomach, it was decompressed, and the scope was completely withdrawn. The patient tolerated the procedure well and was taken to the post anesthesia care unit in good condition. Estimated Blood loss:  none    Impression: Normal EGD      Recommendations:Continue Pantoprazole.     Sharlotte Apley, MD, MD  [facility]

## (undated) DEVICE — FORCEPS BX 240CM 2.4MM L NDL RAD JAW 4 M00513334